# Patient Record
Sex: FEMALE | Race: WHITE | NOT HISPANIC OR LATINO | URBAN - METROPOLITAN AREA
[De-identification: names, ages, dates, MRNs, and addresses within clinical notes are randomized per-mention and may not be internally consistent; named-entity substitution may affect disease eponyms.]

---

## 2020-11-09 ENCOUNTER — IMPORTED ENCOUNTER (OUTPATIENT)
Dept: URBAN - METROPOLITAN AREA CLINIC 31 | Facility: CLINIC | Age: 50
End: 2020-11-09

## 2020-11-09 PROBLEM — G24.5: Noted: 2020-11-09

## 2020-11-09 PROCEDURE — 99204 OFFICE O/P NEW MOD 45 MIN: CPT

## 2020-11-09 NOTE — PATIENT DISCUSSION
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 Tears prn If worsens to the point of involuntary lid closure t/c botox referalReturn for an appointment in 2 years for comprehensive exam. with Dr. Indigo Angel.

## 2022-04-02 ASSESSMENT — TONOMETRY
OS_IOP_MMHG: 16
OD_IOP_MMHG: 16

## 2022-04-02 ASSESSMENT — VISUAL ACUITY
OD_CC: 20/50-1
OS_SC: 20/20
OS_CC: J316''
OS_CC: 20/50-1
OD_CC: J316''
OD_SC: 20/25

## 2022-07-09 ENCOUNTER — TELEPHONE ENCOUNTER (OUTPATIENT)
Dept: URBAN - METROPOLITAN AREA CLINIC 121 | Facility: CLINIC | Age: 52
End: 2022-07-09

## 2022-07-09 RX ORDER — DEXTROSE 4 G
TABLET,CHEWABLE ORAL ONCE A DAY
Refills: 0 | OUTPATIENT
Start: 2017-09-14 | End: 2018-02-14

## 2022-07-09 RX ORDER — OMEPRAZOLE 20 MG/1
CAPSULE, DELAYED RELEASE ORAL ONCE A DAY
Refills: 0 | OUTPATIENT
Start: 2017-09-14 | End: 2017-09-14

## 2022-07-09 RX ORDER — OMEPRAZOLE 40 MG/1
TAKE 1 TAB PO QAM 30-60 MINS BEFORE BREAKFAST CAPSULE, DELAYED RELEASE ORAL ONCE A DAY
Refills: 1 | OUTPATIENT
Start: 2017-08-10 | End: 2018-02-14

## 2022-07-09 RX ORDER — DEXTROSE 4 G
TABLET,CHEWABLE ORAL ONCE A DAY
Refills: 0 | OUTPATIENT
Start: 2017-08-10 | End: 2017-09-14

## 2022-07-09 RX ORDER — DEXTROSE 4 G
TABLET,CHEWABLE ORAL ONCE A DAY
Refills: 0 | OUTPATIENT
Start: 2018-03-12 | End: 2019-04-04

## 2022-07-09 RX ORDER — OMEPRAZOLE 20 MG/1
CAPSULE, DELAYED RELEASE ORAL ONCE A DAY
Refills: 0 | OUTPATIENT
Start: 2017-08-10 | End: 2017-09-14

## 2022-07-09 RX ORDER — DEXTROSE 4 G
TABLET,CHEWABLE ORAL ONCE A DAY
Refills: 0 | OUTPATIENT
Start: 2018-02-14 | End: 2018-03-12

## 2022-07-09 RX ORDER — SUCRALFATE 1 G/10ML
TAKE 10ML PO QID, AC & HS SUSPENSION ORAL
Refills: 0 | OUTPATIENT
Start: 2017-08-10 | End: 2017-09-14

## 2022-07-09 RX ORDER — OMEPRAZOLE 20 MG/1
ONCE A DAY CAPSULE, DELAYED RELEASE ORAL ONCE A DAY
Refills: 0 | OUTPATIENT
Start: 2018-02-23 | End: 2019-04-04

## 2022-07-10 ENCOUNTER — TELEPHONE ENCOUNTER (OUTPATIENT)
Dept: URBAN - METROPOLITAN AREA CLINIC 121 | Facility: CLINIC | Age: 52
End: 2022-07-10

## 2022-07-10 RX ORDER — DEXTROSE 4 G
TABLET,CHEWABLE ORAL ONCE A DAY
Refills: 0 | Status: ACTIVE | COMMUNITY
Start: 2019-04-04